# Patient Record
Sex: FEMALE | Race: WHITE
[De-identification: names, ages, dates, MRNs, and addresses within clinical notes are randomized per-mention and may not be internally consistent; named-entity substitution may affect disease eponyms.]

---

## 2022-05-11 ENCOUNTER — HOSPITAL ENCOUNTER (EMERGENCY)
Dept: HOSPITAL 47 - EC | Age: 62
Discharge: HOME | End: 2022-05-11
Payer: MEDICARE

## 2022-05-11 VITALS — DIASTOLIC BLOOD PRESSURE: 60 MMHG | HEART RATE: 82 BPM | SYSTOLIC BLOOD PRESSURE: 110 MMHG | TEMPERATURE: 98 F

## 2022-05-11 VITALS — RESPIRATION RATE: 20 BRPM

## 2022-05-11 DIAGNOSIS — M54.50: Primary | ICD-10-CM

## 2022-05-11 LAB
ALBUMIN SERPL-MCNC: 4.2 G/DL (ref 3.5–5)
ALP SERPL-CCNC: 49 U/L (ref 38–126)
ALT SERPL-CCNC: 26 U/L (ref 4–34)
AMYLASE SERPL-CCNC: 75 U/L (ref 30–110)
ANION GAP SERPL CALC-SCNC: 6 MMOL/L
AST SERPL-CCNC: 28 U/L (ref 14–36)
BASOPHILS # BLD AUTO: 0 K/UL (ref 0–0.2)
BASOPHILS NFR BLD AUTO: 1 %
BUN SERPL-SCNC: 11 MG/DL (ref 7–17)
CALCIUM SPEC-MCNC: 9.1 MG/DL (ref 8.4–10.2)
CHLORIDE SERPL-SCNC: 101 MMOL/L (ref 98–107)
CO2 SERPL-SCNC: 29 MMOL/L (ref 22–30)
EOSINOPHIL # BLD AUTO: 0.3 K/UL (ref 0–0.7)
EOSINOPHIL NFR BLD AUTO: 4 %
ERYTHROCYTE [DISTWIDTH] IN BLOOD BY AUTOMATED COUNT: 3.98 M/UL (ref 3.8–5.4)
ERYTHROCYTE [DISTWIDTH] IN BLOOD: 13.2 % (ref 11.5–15.5)
GLUCOSE SERPL-MCNC: 218 MG/DL (ref 74–99)
HCT VFR BLD AUTO: 40.5 % (ref 34–46)
HGB BLD-MCNC: 12.9 GM/DL (ref 11.4–16)
LIPASE SERPL-CCNC: 165 U/L (ref 23–300)
LYMPHOCYTES # SPEC AUTO: 2.1 K/UL (ref 1–4.8)
LYMPHOCYTES NFR SPEC AUTO: 28 %
MCH RBC QN AUTO: 32.5 PG (ref 25–35)
MCHC RBC AUTO-ENTMCNC: 31.8 G/DL (ref 31–37)
MCV RBC AUTO: 101.9 FL (ref 80–100)
MONOCYTES # BLD AUTO: 0.4 K/UL (ref 0–1)
MONOCYTES NFR BLD AUTO: 5 %
NEUTROPHILS # BLD AUTO: 4.5 K/UL (ref 1.3–7.7)
NEUTROPHILS NFR BLD AUTO: 61 %
PH UR: 6.5 [PH] (ref 5–8)
PLATELET # BLD AUTO: 266 K/UL (ref 150–450)
POTASSIUM SERPL-SCNC: 4.3 MMOL/L (ref 3.5–5.1)
PROT SERPL-MCNC: 7.2 G/DL (ref 6.3–8.2)
RBC UR QL: 1 /HPF (ref 0–5)
SODIUM SERPL-SCNC: 136 MMOL/L (ref 137–145)
SP GR UR: 1.02 (ref 1–1.03)
SQUAMOUS UR QL AUTO: 1 /HPF (ref 0–4)
UROBILINOGEN UR QL STRIP: <2 MG/DL (ref ?–2)
WBC # BLD AUTO: 7.4 K/UL (ref 3.8–10.6)
WBC # UR AUTO: 3 /HPF (ref 0–5)

## 2022-05-11 PROCEDURE — 85025 COMPLETE CBC W/AUTO DIFF WBC: CPT

## 2022-05-11 PROCEDURE — 74176 CT ABD & PELVIS W/O CONTRAST: CPT

## 2022-05-11 PROCEDURE — 36415 COLL VENOUS BLD VENIPUNCTURE: CPT

## 2022-05-11 PROCEDURE — 96375 TX/PRO/DX INJ NEW DRUG ADDON: CPT

## 2022-05-11 PROCEDURE — 96361 HYDRATE IV INFUSION ADD-ON: CPT

## 2022-05-11 PROCEDURE — 83690 ASSAY OF LIPASE: CPT

## 2022-05-11 PROCEDURE — 96374 THER/PROPH/DIAG INJ IV PUSH: CPT

## 2022-05-11 PROCEDURE — 81001 URINALYSIS AUTO W/SCOPE: CPT

## 2022-05-11 PROCEDURE — 82150 ASSAY OF AMYLASE: CPT

## 2022-05-11 PROCEDURE — 83605 ASSAY OF LACTIC ACID: CPT

## 2022-05-11 PROCEDURE — 80053 COMPREHEN METABOLIC PANEL: CPT

## 2022-05-11 PROCEDURE — 99284 EMERGENCY DEPT VISIT MOD MDM: CPT

## 2022-05-11 NOTE — ED
Abdominal Pain HPI





- General


Chief Complaint: Abdominal Pain


Stated Complaint: Back pain


Time Seen by Provider: 05/11/22 11:32


Source: patient, EMS, RN notes reviewed


Mode of arrival: EMS


Limitations: no limitations





- History of Present Illness


Initial Comments: 


61-year-old female presents emergency Department with chief complaint of flank 

pain.  Patient is a sudden onset.  Nothing makes the pain feel better or worse 

at this time.  She's a history kidney stones.  Slight nausea no vomiting no 

diarrhea no constipation no dysuria no hematuria.  Patient states that she had a

call EMS because of the discomfort.  She rates the pain 7 on a 10 NO KNOWN DRUG 

ALLERGIES.








- Related Data


                                  Previous Rx's











 Medication  Instructions  Recorded


 


Cyclobenzaprine [Flexeril] 5 mg PO TID PRN #15 tablet 05/11/22


 


Ibuprofen [Motrin] 600 mg PO Q8HR PRN #20 tab 05/11/22











                                    Allergies











Allergy/AdvReac Type Severity Reaction Status Date / Time


 


No Known Allergies Allergy   Verified 05/11/22 11:37














Review of Systems


ROS Statement: 


Those systems with pertinent positive or pertinent negative responses have been 

documented in the HPI.





ROS Other: All systems not noted in ROS Statement are negative.





Past Medical History


Past Medical History: Diabetes Mellitus


History of Any Multi-Drug Resistant Organisms: None Reported


Past Surgical History: No Surgical Hx Reported


Past Psychological History: Anxiety, Depression


Smoking Status: Never smoker


Past Alcohol Use History: Occasional


Past Drug Use History: None Reported





General Exam


General appearance: alert, in no apparent distress


Head exam: Present: atraumatic, normocephalic, normal inspection


Eye exam: Present: normal appearance, PERRL, EOMI.  Absent: scleral icterus, 

conjunctival injection, periorbital swelling


ENT exam: Present: normal exam, normal oropharynx, mucous membranes moist


Neck exam: Present: normal inspection, full ROM.  Absent: tenderness, 

meningismus, lymphadenopathy


Respiratory exam: Present: normal lung sounds bilaterally.  Absent: respiratory 

distress, wheezes, rales, rhonchi, stridor


Cardiovascular Exam: Present: regular rate, normal rhythm, normal heart sounds. 

Absent: systolic murmur, diastolic murmur, rubs, gallop, clicks


GI/Abdominal exam: Present: soft, normal bowel sounds.  Absent: distended, 

tenderness, guarding, rebound, rigid


Back exam: Present: normal inspection, full ROM, tenderness, CVA tenderness (L).

 Absent: CVA tenderness (R)


Neurological exam: Present: alert, oriented X3





Course


                                   Vital Signs











  05/11/22





  11:29


 


Temperature 97.7 F


 


Pulse Rate 87


 


Respiratory 20





Rate 


 


Blood Pressure 107/57


 


O2 Sat by Pulse 95





Oximetry 














Medical Decision Making





- Medical Decision Making





patient presented for flank pain, back pain.  Does seem to be sporadic in nature

and may be related to mechanical back pain patient CT is unremarkable labs and 

urinalysis unremarkable patient we discharged stable condition return parameters

discussed.





- Lab Data


Result diagrams: 


                                 05/11/22 12:40





                                 05/11/22 12:40


                                   Lab Results











  05/11/22 05/11/22 05/11/22 Range/Units





  12:40 12:40 12:40 


 


WBC  7.4    (3.8-10.6)  k/uL


 


RBC  3.98    (3.80-5.40)  m/uL


 


Hgb  12.9    (11.4-16.0)  gm/dL


 


Hct  40.5    (34.0-46.0)  %


 


MCV  101.9 H    (80.0-100.0)  fL


 


MCH  32.5    (25.0-35.0)  pg


 


MCHC  31.8    (31.0-37.0)  g/dL


 


RDW  13.2    (11.5-15.5)  %


 


Plt Count  266    (150-450)  k/uL


 


MPV  7.3    


 


Neutrophils %  61    %


 


Lymphocytes %  28    %


 


Monocytes %  5    %


 


Eosinophils %  4    %


 


Basophils %  1    %


 


Neutrophils #  4.5    (1.3-7.7)  k/uL


 


Lymphocytes #  2.1    (1.0-4.8)  k/uL


 


Monocytes #  0.4    (0-1.0)  k/uL


 


Eosinophils #  0.3    (0-0.7)  k/uL


 


Basophils #  0.0    (0-0.2)  k/uL


 


Macrocytosis  Slight    


 


Sodium    136 L  (137-145)  mmol/L


 


Potassium    4.3  (3.5-5.1)  mmol/L


 


Chloride    101  ()  mmol/L


 


Carbon Dioxide    29  (22-30)  mmol/L


 


Anion Gap    6  mmol/L


 


BUN    11  (7-17)  mg/dL


 


Creatinine    0.63  (0.52-1.04)  mg/dL


 


Est GFR (CKD-EPI)AfAm    >90  (>60 ml/min/1.73 sqM)  


 


Est GFR (CKD-EPI)NonAf    >90  (>60 ml/min/1.73 sqM)  


 


Glucose    218 H  (74-99)  mg/dL


 


Plasma Lactic Acid Silvio     (0.7-2.0)  mmol/L


 


Calcium    9.1  (8.4-10.2)  mg/dL


 


Total Bilirubin    0.3  (0.2-1.3)  mg/dL


 


AST    28  (14-36)  U/L


 


ALT    26  (4-34)  U/L


 


Alkaline Phosphatase    49  ()  U/L


 


Total Protein    7.2  (6.3-8.2)  g/dL


 


Albumin    4.2  (3.5-5.0)  g/dL


 


Amylase    75  ()  U/L


 


Lipase    165  ()  U/L


 


Urine Color   Yellow   


 


Urine Appearance   Cloudy H   (Clear)  


 


Urine pH   6.5   (5.0-8.0)  


 


Ur Specific Gravity   1.023   (1.001-1.035)  


 


Urine Protein   Negative   (Negative)  


 


Urine Glucose (UA)   Negative   (Negative)  


 


Urine Ketones   Negative   (Negative)  


 


Urine Blood   Negative   (Negative)  


 


Urine Nitrite   Negative   (Negative)  


 


Urine Bilirubin   Negative   (Negative)  


 


Urine Urobilinogen   <2.0   (<2.0)  mg/dL


 


Ur Leukocyte Esterase   Negative   (Negative)  


 


Urine RBC   1   (0-5)  /hpf


 


Urine WBC   3   (0-5)  /hpf


 


Ur Squamous Epith Cells   1   (0-4)  /hpf


 


Calcium Oxalate Crystal   Few H   (None)  /hpf


 


Amorphous Sediment   Moderate H   (None)  /hpf


 


Urine Mucus   Few H   (None)  /hpf














  05/11/22 Range/Units





  12:40 


 


WBC   (3.8-10.6)  k/uL


 


RBC   (3.80-5.40)  m/uL


 


Hgb   (11.4-16.0)  gm/dL


 


Hct   (34.0-46.0)  %


 


MCV   (80.0-100.0)  fL


 


MCH   (25.0-35.0)  pg


 


MCHC   (31.0-37.0)  g/dL


 


RDW   (11.5-15.5)  %


 


Plt Count   (150-450)  k/uL


 


MPV   


 


Neutrophils %   %


 


Lymphocytes %   %


 


Monocytes %   %


 


Eosinophils %   %


 


Basophils %   %


 


Neutrophils #   (1.3-7.7)  k/uL


 


Lymphocytes #   (1.0-4.8)  k/uL


 


Monocytes #   (0-1.0)  k/uL


 


Eosinophils #   (0-0.7)  k/uL


 


Basophils #   (0-0.2)  k/uL


 


Macrocytosis   


 


Sodium   (137-145)  mmol/L


 


Potassium   (3.5-5.1)  mmol/L


 


Chloride   ()  mmol/L


 


Carbon Dioxide   (22-30)  mmol/L


 


Anion Gap   mmol/L


 


BUN   (7-17)  mg/dL


 


Creatinine   (0.52-1.04)  mg/dL


 


Est GFR (CKD-EPI)AfAm   (>60 ml/min/1.73 sqM)  


 


Est GFR (CKD-EPI)NonAf   (>60 ml/min/1.73 sqM)  


 


Glucose   (74-99)  mg/dL


 


Plasma Lactic Acid Silvio  1.2  (0.7-2.0)  mmol/L


 


Calcium   (8.4-10.2)  mg/dL


 


Total Bilirubin   (0.2-1.3)  mg/dL


 


AST   (14-36)  U/L


 


ALT   (4-34)  U/L


 


Alkaline Phosphatase   ()  U/L


 


Total Protein   (6.3-8.2)  g/dL


 


Albumin   (3.5-5.0)  g/dL


 


Amylase   ()  U/L


 


Lipase   ()  U/L


 


Urine Color   


 


Urine Appearance   (Clear)  


 


Urine pH   (5.0-8.0)  


 


Ur Specific Gravity   (1.001-1.035)  


 


Urine Protein   (Negative)  


 


Urine Glucose (UA)   (Negative)  


 


Urine Ketones   (Negative)  


 


Urine Blood   (Negative)  


 


Urine Nitrite   (Negative)  


 


Urine Bilirubin   (Negative)  


 


Urine Urobilinogen   (<2.0)  mg/dL


 


Ur Leukocyte Esterase   (Negative)  


 


Urine RBC   (0-5)  /hpf


 


Urine WBC   (0-5)  /hpf


 


Ur Squamous Epith Cells   (0-4)  /hpf


 


Calcium Oxalate Crystal   (None)  /hpf


 


Amorphous Sediment   (None)  /hpf


 


Urine Mucus   (None)  /hpf














Disposition


Clinical Impression: 


 Back pain





Disposition: HOME SELF-CARE


Condition: Stable


Instructions (If sedation given, give patient instructions):  Back Pain (ED)


Additional Instructions: 


Please return to the Emergency Department if symptoms worsen or any other 

concerns.


Prescriptions: 


Cyclobenzaprine [Flexeril] 5 mg PO TID PRN #15 tablet


 PRN Reason: Muscle Spasm


Ibuprofen [Motrin] 600 mg PO Q8HR PRN #20 tab


 PRN Reason: Pain


Is patient prescribed a controlled substance at d/c from ED?: No


Referrals: 


None,Stated [Primary Care Provider] - 1-2 days


Time of Disposition: 14:50

## 2022-05-11 NOTE — CT
EXAMINATION TYPE: CT abdomen pelvis wo con

 

DATE OF EXAM: 5/11/2022

 

COMPARISON: None available

 

HISTORY: Abdominal pain

 

CT DLP: 384 mGycm

Automated exposure control for dose reduction was used.

 

TECHNIQUE:  Helical acquisition of images was performed from the lung bases through the pelvis. No IV
 contrast administration.

 

FINDINGS: 

 

LUNG BASES: No significant abnormality is appreciated.

 

LIVER/GB: No definite focal lesion by this nonenhanced CT scan. The gallbladder is not completely dis
tended with suboptimal assessment of that location.

 

PANCREAS: Atrophic.

 

SPLEEN: No significant abnormality is seen.

 

ADRENALS: No significant abnormality is seen.

 

KIDNEYS: No radiodense renal calculi. No hydroureter or hydronephrosis.

 

FREE AIR:  No free air is visualized

 

RETROPERITONEAL ADENOPATHY:  None visualized

 

REPRODUCTIVE ORGANS: No gross uterine or adnexal mass yet suboptimally assessed.

 

URINARY BLADDER:  Almost completely collapsed.

 

PELVIC ADENOPATHY:  None visualized.

 

OSSEOUS STRUCTURES:  Degenerative changes of the lower thoracic and lumbar spine with the dextroscoli
osis of the lower thoracic spine and levoscoliosis of the lumbar spine.

 

BOWEL:  Questionable thickening of the inferior aspect of the esophagus. Dilated fluid-filled stomach
. Unremarkable duodenum and small bowel. No evidence of bowel obstruction. Suboptimal assessment of t
he small and large bowel. Moderate fecal loading of the colon.

 

OTHER: Minimal arterial atherosclerotic calcifications. No sizable ascites.

 

IMPRESSION:

No definite acute abnormality seen in the abdomen or the pelvis by this nonenhanced CT scan. Incident
al findings as described above.

## 2022-09-03 ENCOUNTER — HOSPITAL ENCOUNTER (EMERGENCY)
Dept: HOSPITAL 47 - EC | Age: 62
Discharge: HOME | End: 2022-09-03
Payer: MEDICARE

## 2022-09-03 VITALS
RESPIRATION RATE: 18 BRPM | DIASTOLIC BLOOD PRESSURE: 75 MMHG | TEMPERATURE: 98.2 F | SYSTOLIC BLOOD PRESSURE: 139 MMHG | HEART RATE: 97 BPM

## 2022-09-03 DIAGNOSIS — R45.851: ICD-10-CM

## 2022-09-03 DIAGNOSIS — F39: Primary | ICD-10-CM

## 2022-09-03 LAB
PH UR: 8 [PH] (ref 5–8)
RBC UR QL: <1 /HPF (ref 0–5)
SP GR UR: 1.02 (ref 1–1.03)
SQUAMOUS UR QL AUTO: 2 /HPF (ref 0–4)
UROBILINOGEN UR QL STRIP: <2 MG/DL (ref ?–2)
WBC #/AREA URNS HPF: 3 /HPF (ref 0–5)

## 2022-09-03 PROCEDURE — 81001 URINALYSIS AUTO W/SCOPE: CPT

## 2022-09-03 PROCEDURE — 99285 EMERGENCY DEPT VISIT HI MDM: CPT

## 2022-09-03 PROCEDURE — 80306 DRUG TEST PRSMV INSTRMNT: CPT

## 2022-09-03 PROCEDURE — 82075 ASSAY OF BREATH ETHANOL: CPT

## 2022-09-03 NOTE — ED
Psych HPI





- General


Chief Complaint: Psychiatric Symptoms


Stated Complaint: EPS eval


Time Seen by Provider: 09/03/22 18:36


Source: patient


Mode of arrival: EMS


Limitations: no limitations





- History of Present Illness


Initial Comments: 





This patient is a 62-year-old woman brought in to have evaluation after she 

became upset and had expressed some suicidal thoughts.  Patient is at [facility] home, 

and states that she had a stressful conversation with her sister.  She states 

that they had called each other names and then she became quite upset.  The 

patient also reportedly had told one staff there that she wish she would die.  

The patient states she does not have a plan.


MD Complaint: suicidal ideation, feels depressed


-: hour(s)


Associated Psychiatric Symptoms: depression, suicidal ideation


History of same: Yes


Quality: getting worse


Improves With: none


Worsens With: none


Context: significant life stressor





- Related Data


                                  Previous Rx's











 Medication  Instructions  Recorded


 


Cyclobenzaprine [Flexeril] 5 mg PO TID PRN #15 tablet 05/11/22


 


Ibuprofen [Motrin] 600 mg PO Q8HR PRN #20 tab 05/11/22


 


Lidocaine 5% Patch [Lidoderm 5% 1 each TP DAILY PRN 14 Days #14 08/20/22





Patch] patch 


 


guaiFENesin 200 mg PO BID PRN 10 Days #20 08/21/22





 tablet 











                                    Allergies











Allergy/AdvReac Type Severity Reaction Status Date / Time


 


No Known Allergies Allergy   Verified 08/21/22 16:59














Review of Systems


ROS Statement: 


Those systems with pertinent positive or pertinent negative responses have been 

documented in the HPI.





ROS Other: All systems not noted in ROS Statement are negative.


Constitutional: Denies: fever, weakness


Eyes: Denies: vision change


Respiratory: Denies: cough, dyspnea


Cardiovascular: Denies: chest pain, syncope


Gastrointestinal: Denies: abdominal pain, vomiting, diarrhea


Genitourinary: Denies: dysuria


Musculoskeletal: Denies: back pain


Neurological: Denies: headache, weakness


Psychiatric: Reports: depression, suicidal thoughts





Past Medical History


Past Medical History: Diabetes Mellitus


History of Any Multi-Drug Resistant Organisms: None Reported


Past Surgical History: No Surgical Hx Reported


Past Psychological History: Anxiety, Depression


Smoking Status: Never smoker


Past Alcohol Use History: Occasional


Past Drug Use History: None Reported





General Exam


Limitations: altered mental status


General appearance: alert, anxious


Head exam: Present: atraumatic, normocephalic


Eye exam: Present: normal appearance.  Absent: scleral icterus, conjunctival 

injection


ENT exam: Present: normal oropharynx


Neck exam: Present: normal inspection


Respiratory exam: Present: normal lung sounds bilaterally.  Absent: respiratory 

distress, wheezes, rales, rhonchi, stridor


Cardiovascular Exam: Present: regular rate, normal rhythm, normal heart sounds. 

Absent: systolic murmur, diastolic murmur, rubs, gallop


GI/Abdominal exam: Present: soft.  Absent: distended, tenderness, guarding, 

rebound


Extremities exam: Present: normal inspection, normal capillary refill.  Absent: 

pedal edema


Neurological exam: Present: alert, normal gait


Psychiatric exam: Present: normal affect, anxious, other (Patient displays 

labile mood. ).  Absent: depressed, agitated, flat affect, homicidal ideation, 

suicidal ideation


Skin exam: Present: warm, dry, intact, normal color.  Absent: rash





Course


                                   Vital Signs











  09/03/22





  18:40


 


Temperature 98.2 F


 


Pulse Rate 97


 


Respiratory 18





Rate 


 


Blood Pressure 139/75


 


O2 Sat by Pulse 97





Oximetry 














Medical Decision Making





- Lab Data


                                   Lab Results











  09/03/22 Range/Units





  20:11 


 


Urine Color  Yellow  


 


Urine Appearance  Cloudy H  (Clear)  


 


Urine pH  8.0  (5.0-8.0)  


 


Ur Specific Gravity  1.025  (1.001-1.035)  


 


Urine Protein  Trace H  (Negative)  


 


Urine Glucose (UA)  Negative  (Negative)  


 


Urine Ketones  Negative  (Negative)  


 


Urine Blood  Negative  (Negative)  


 


Urine Nitrite  Negative  (Negative)  


 


Urine Bilirubin  Negative  (Negative)  


 


Urine Urobilinogen  <2.0  (<2.0)  mg/dL


 


Ur Leukocyte Esterase  Small H  (Negative)  


 


Urine RBC  <1  (0-5)  /hpf


 


Urine WBC  3  (0-5)  /hpf


 


Ur Squamous Epith Cells  2  (0-4)  /hpf


 


Calcium Oxalate Crystal  Many H  (None)  /hpf


 


Urine Mucus  Rare H  (None)  /hpf














Disposition


Clinical Impression: 


 Mood disorder





Disposition: HOME SELF-CARE


Condition: Good


Instructions (If sedation given, give patient instructions):  Mood Disorders 

(ED)


Is patient prescribed a controlled substance at d/c from ED?: No


Referrals: 


Nonstaff,Physician [Primary Care Provider] - 1-2 days

## 2022-09-04 ENCOUNTER — HOSPITAL ENCOUNTER (EMERGENCY)
Dept: HOSPITAL 47 - EC | Age: 62
Discharge: OTHER INSTITUTION | End: 2022-09-04
Payer: MEDICARE

## 2022-09-04 VITALS — TEMPERATURE: 98 F | HEART RATE: 88 BPM | DIASTOLIC BLOOD PRESSURE: 74 MMHG | SYSTOLIC BLOOD PRESSURE: 129 MMHG

## 2022-09-04 VITALS — RESPIRATION RATE: 18 BRPM

## 2022-09-04 DIAGNOSIS — F13.90: ICD-10-CM

## 2022-09-04 DIAGNOSIS — E11.9: ICD-10-CM

## 2022-09-04 DIAGNOSIS — F39: Primary | ICD-10-CM

## 2022-09-04 PROCEDURE — 80306 DRUG TEST PRSMV INSTRMNT: CPT

## 2022-09-04 PROCEDURE — 99285 EMERGENCY DEPT VISIT HI MDM: CPT

## 2022-09-04 PROCEDURE — 71046 X-RAY EXAM CHEST 2 VIEWS: CPT

## 2022-09-04 PROCEDURE — 82075 ASSAY OF BREATH ETHANOL: CPT

## 2022-09-04 NOTE — ED
Psych HPI





- General


Stated Complaint: EPS eval


Time Seen by Provider: 09/04/22 15:07


Source: patient, police, EMS, RN notes reviewed, old records reviewed


Mode of arrival: EMS





- History of Present Illness


Initial Comments: 





62-year-old female history depression who was brought in by EMS today with 

complaints of suicidal thoughts and ideation I did discuss this with the 

paramedics as well as police who did accompany the patient.  She is expressing 

suicidal thoughts though no particular plan at this time.  She was evaluated for

the same yesterday.  She has a fevers chills nausea times sweats no drugs or 

alcohol.  She states she does have a right rib pain from a fall from 3 weeks 

ago.  She has been previously evaluated for this.


MD Complaint: suicidal ideation, feels depressed





- Related Data


                                  Previous Rx's











 Medication  Instructions  Recorded


 


Cyclobenzaprine [Flexeril] 5 mg PO TID PRN #15 tablet 05/11/22


 


Ibuprofen [Motrin] 600 mg PO Q8HR PRN #20 tab 05/11/22


 


Lidocaine 5% Patch [Lidoderm 5% 1 each TP DAILY PRN 14 Days #14 08/20/22





Patch] patch 


 


guaiFENesin 200 mg PO BID PRN 10 Days #20 08/21/22





 tablet 











                                    Allergies











Allergy/AdvReac Type Severity Reaction Status Date / Time


 


No Known Allergies Allergy   Verified 08/21/22 16:59














Review of Systems


ROS Statement: 


Those systems with pertinent positive or pertinent negative responses have been 

documented in the HPI.





ROS Other: All systems not noted in ROS Statement are negative.





Past Medical History


Past Medical History: Diabetes Mellitus


History of Any Multi-Drug Resistant Organisms: None Reported


Past Surgical History: No Surgical Hx Reported


Past Psychological History: Anxiety, Depression


Smoking Status: Never smoker


Past Alcohol Use History: Occasional


Past Drug Use History: None Reported





General Exam





- General Exam Comments


Initial Comments: 





This is a well-developed thin appearing female who is awake and alert oriented 

4


General appearance: alert, in no apparent distress


Head exam: Present: atraumatic, normocephalic, normal inspection


Eye exam: Present: normal appearance, PERRL, EOMI.  Absent: scleral icterus, 

conjunctival injection, periorbital swelling


ENT exam: Present: normal exam, mucous membranes moist


Neck exam: Present: normal inspection, full ROM, other (No stridor JVD or 

bruits).  Absent: tenderness, meningismus, lymphadenopathy


Respiratory exam: Present: normal lung sounds bilaterally, chest wall tenderness

(Alrex side).  Absent: respiratory distress, wheezes, rales, rhonchi, stridor


Cardiovascular Exam: Present: regular rate, normal rhythm, normal heart sounds. 

Absent: systolic murmur, diastolic murmur, rubs, gallop, clicks


GI/Abdominal exam: Present: soft, normal bowel sounds.  Absent: distended, 

tenderness, guarding, rebound, rigid


Extremities exam: Present: normal inspection, full ROM, normal capillary refill.

 Absent: tenderness, pedal edema, joint swelling, calf tenderness


Back exam: Present: normal inspection


Neurological exam: Present: alert, oriented X3, CN II-XII intact


Psychiatric exam: Present: depressed, anxious, flat affect, suicidal ideation


Skin exam: Present: warm, dry, intact, normal color.  Absent: rash





Course


                                   Vital Signs











  09/04/22





  15:15


 


Temperature 98.4 F


 


Pulse Rate 68


 


Respiratory 18





Rate 


 


Blood Pressure 116/92


 


O2 Sat by Pulse 95





Oximetry 














Medical Decision Making





- Medical Decision Making





The patient was evaluated by the EPS service and found to be wrist herself or 

anyone else at this time she will be discharged





- Lab Data


                                   Lab Results











  09/04/22 Range/Units





  16:24 


 


Urine Opiates Screen  Not Detected  (NotDetected)  


 


Ur Oxycodone Screen  Not Detected  (NotDetected)  


 


Urine Methadone Screen  Not Detected  (NotDetected)  


 


Ur Propoxyphene Screen  Not Detected  (NotDetected)  


 


Ur Barbiturates Screen  Not Detected  (NotDetected)  


 


U Tricyclic Antidepress  Not Detected  (NotDetected)  


 


Ur Phencyclidine Scrn  Not Detected  (NotDetected)  


 


Ur Amphetamines Screen  Not Detected  (NotDetected)  


 


U Methamphetamines Scrn  Not Detected  (NotDetected)  


 


U Benzodiazepines Scrn  Detected H  (NotDetected)  


 


Urine Cocaine Screen  Not Detected  (NotDetected)  


 


U Marijuana (THC) Screen  Not Detected  (NotDetected)  














Disposition


Clinical Impression: 


 Mood disorder, Adjustment reaction of adult life





Disposition: ADMITTED AS IP TO THIS [facility]


Condition: Good


Instructions (If sedation given, give patient instructions):  Mood Disorders 

(ED)


Is patient prescribed a controlled substance at d/c from ED?: No


Referrals: 


Nonstaff,Physician [Primary Care Provider] - 1-2 days


Decision Date: 09/04/22


Decision Time: 19:28

## 2022-09-04 NOTE — XR
EXAMINATION TYPE: XR chest 2V

 

DATE OF EXAM: 9/4/2022

 

COMPARISON: NONE

 

HISTORY: Chest pain

 

TECHNIQUE: 2 views

 

FINDINGS: Heart and mediastinum are normal. Lungs are clear. Diaphragm is normal. Bony thorax is inta
ct. There is mild thoracic dextroscoliosis.

 

IMPRESSION: No active cardiopulmonary disease. Normal heart. No change.

## 2022-09-07 ENCOUNTER — HOSPITAL ENCOUNTER (EMERGENCY)
Dept: HOSPITAL 47 - EC | Age: 62
Discharge: HOME | End: 2022-09-07
Payer: MEDICARE

## 2022-09-07 VITALS
RESPIRATION RATE: 18 BRPM | TEMPERATURE: 97.9 F | DIASTOLIC BLOOD PRESSURE: 69 MMHG | HEART RATE: 87 BPM | SYSTOLIC BLOOD PRESSURE: 133 MMHG

## 2022-09-07 DIAGNOSIS — R45.851: Primary | ICD-10-CM

## 2022-09-07 DIAGNOSIS — F13.90: ICD-10-CM

## 2022-09-07 DIAGNOSIS — E11.9: ICD-10-CM

## 2022-09-07 LAB
ANION GAP SERPL CALC-SCNC: 11 MMOL/L
BASOPHILS # BLD AUTO: 0.1 K/UL (ref 0–0.2)
BASOPHILS NFR BLD AUTO: 1 %
BUN SERPL-SCNC: 8 MG/DL (ref 7–17)
CALCIUM SPEC-MCNC: 9.1 MG/DL (ref 8.4–10.2)
CHLORIDE SERPL-SCNC: 93 MMOL/L (ref 98–107)
CO2 SERPL-SCNC: 24 MMOL/L (ref 22–30)
EOSINOPHIL # BLD AUTO: 0.3 K/UL (ref 0–0.7)
EOSINOPHIL NFR BLD AUTO: 3 %
ERYTHROCYTE [DISTWIDTH] IN BLOOD BY AUTOMATED COUNT: 3.59 M/UL (ref 3.8–5.4)
ERYTHROCYTE [DISTWIDTH] IN BLOOD: 12.5 % (ref 11.5–15.5)
GLUCOSE SERPL-MCNC: 82 MG/DL (ref 74–99)
HCT VFR BLD AUTO: 36.2 % (ref 34–46)
HGB BLD-MCNC: 11.6 GM/DL (ref 11.4–16)
LYMPHOCYTES # SPEC AUTO: 3.5 K/UL (ref 1–4.8)
LYMPHOCYTES NFR SPEC AUTO: 39 %
MCH RBC QN AUTO: 32.2 PG (ref 25–35)
MCHC RBC AUTO-ENTMCNC: 31.9 G/DL (ref 31–37)
MCV RBC AUTO: 100.9 FL (ref 80–100)
MONOCYTES # BLD AUTO: 0.4 K/UL (ref 0–1)
MONOCYTES NFR BLD AUTO: 5 %
NEUTROPHILS # BLD AUTO: 4.6 K/UL (ref 1.3–7.7)
NEUTROPHILS NFR BLD AUTO: 51 %
PLATELET # BLD AUTO: 315 K/UL (ref 150–450)
POTASSIUM SERPL-SCNC: 4.4 MMOL/L (ref 3.5–5.1)
SODIUM SERPL-SCNC: 128 MMOL/L (ref 137–145)
WBC # BLD AUTO: 9 K/UL (ref 3.8–10.6)

## 2022-09-07 PROCEDURE — 36415 COLL VENOUS BLD VENIPUNCTURE: CPT

## 2022-09-07 PROCEDURE — 82075 ASSAY OF BREATH ETHANOL: CPT

## 2022-09-07 PROCEDURE — 85025 COMPLETE CBC W/AUTO DIFF WBC: CPT

## 2022-09-07 PROCEDURE — 80306 DRUG TEST PRSMV INSTRMNT: CPT

## 2022-09-07 PROCEDURE — 99285 EMERGENCY DEPT VISIT HI MDM: CPT

## 2022-09-07 PROCEDURE — 80048 BASIC METABOLIC PNL TOTAL CA: CPT

## 2022-09-07 PROCEDURE — 96360 HYDRATION IV INFUSION INIT: CPT

## 2022-09-07 NOTE — ED
General Adult HPI





- General


Chief complaint: Psychiatric Symptoms


Stated complaint: EPS eval


Time Seen by Provider: 09/07/22 17:13


Source: patient, EMS, RN notes reviewed, old records reviewed


Limitations: no limitations





- History of Present Illness


Initial comments: 





Patient is a 62-year-old female who presents to the department complaining of 

suicidal thoughts for "quite a while."  She has been evaluated multiple times 

over the past week and cleared for discharge.  Currently has no acute 

complaints.  He endorses suicidal ideations but denies any plans or attempts.  

Denies homicidal ideations, attempts, plans.  Denies visual or auditory 

hallucinations.  His no other acute complaints at this time.  Presents for 

further evaluation.  Denies fevers, chills, sick contacts, chest pain, shortness

of breath.





- Related Data


                                  Previous Rx's











 Medication  Instructions  Recorded


 


Cyclobenzaprine [Flexeril] 5 mg PO TID PRN #15 tablet 05/11/22


 


Ibuprofen [Motrin] 600 mg PO Q8HR PRN #20 tab 05/11/22


 


Lidocaine 5% Patch [Lidoderm 5% 1 each TP DAILY PRN 14 Days #14 08/20/22





Patch] patch 


 


guaiFENesin 200 mg PO BID PRN 10 Days #20 08/21/22





 tablet 











                                    Allergies











Allergy/AdvReac Type Severity Reaction Status Date / Time


 


No Known Allergies Allergy   Verified 08/21/22 16:59














Review of Systems


ROS Statement: 


Those systems with pertinent positive or pertinent negative responses have been 

documented in the HPI.


Review of Systems:


CONST: Denies fever 


EYES: Denies blurry vision 


ENT: Denies nasal congestion  


C/V:  Denies Chest pain


RESP: Denies shortness of breath 


GI: Denies abdominal pain 


: Denies dysuria  


SKIN: Denies rash.


MSK: Denies joint pain.


NEURO: Denies headache 


PSYCH: Denies homicidal ideations/plans/attempts.  Denies visual or auditory 

hallucinations.  He endorses suicidal ideations.  Denies plans or attempts.


ROS Other: All systems not noted in ROS Statement are negative.





Past Medical History


Past Medical History: Diabetes Mellitus


History of Any Multi-Drug Resistant Organisms: None Reported


Past Surgical History: No Surgical Hx Reported


Past Psychological History: Anxiety, Depression


Smoking Status: Never smoker


Past Alcohol Use History: Occasional


Past Drug Use History: None Reported





General Exam





- General Exam Comments


Initial Comments: 





General: Appears in no acute distress.


HEAD:  Normal with no signs of head trauma.


EYES:  PERRLA, EOMI, conjunctiva normal, no discharge.


ENT:  Hearing grossly intact, normal oropharynx.


RESPIRATORY:  Clear breath sounds bilaterally.  No wheezes, rales, or rhonchi.  


C/V:  Regular rate and rhythm. S1 and S2 auscultated, no edema, peripheral 

pulses 2+ and intact throughout


ABD:  Abd is soft, nontender, nondistended


EXT: Normal range of motion, no obvious deformity


SKIN:  No rashes or lesions observed on exposed skin.


NEURO: Alert and oriented x 4.  Cranial nerves II-XII intact. No focal sensory 

or strength deficits.


Limitations: no limitations





Course


                                   Vital Signs











  09/07/22





  17:20


 


Temperature 97.9 F


 


Pulse Rate 87


 


Respiratory 18





Rate 


 


Blood Pressure 133/69


 


O2 Sat by Pulse 97





Oximetry 














Medical Decision Making





- Medical Decision Making





Based on the patient's presentation and physical exam, do believe she requires 

psychiatric evaluation.  We will obtain basic laboratory studies due to her age.

 She is pacing green scrubs.  Suicide precautions and sitter were ordered.  

Vital signs are within normal notes.  Patient's laboratory studies are 

remarkable for mild hyponatremia and hypochloremia which will be treated with IV

fluids.  Remainder labs are unremarkable except for a UDS positive for benzos.  

BAT is 0.





At this time patient is medically cleared for evaluation by psychiatry.  

Disposition is pending psychiatric evaluation.  EPS was notified.





EPS evaluated the patient, determined that she does not meet criteria for inp

atient admission.  Cleared her for discharge home.





Patient will be discharged home in stable condition.  Follow safety plan.





- Lab Data


Result diagrams: 


                                 09/07/22 18:18





                                 09/07/22 18:18


                                   Lab Results











  09/07/22 09/07/22 09/07/22 Range/Units





  18:18 18:18 18:18 


 


WBC  9.0    (3.8-10.6)  k/uL


 


RBC  3.59 L    (3.80-5.40)  m/uL


 


Hgb  11.6    (11.4-16.0)  gm/dL


 


Hct  36.2    (34.0-46.0)  %


 


MCV  100.9 H    (80.0-100.0)  fL


 


MCH  32.2    (25.0-35.0)  pg


 


MCHC  31.9    (31.0-37.0)  g/dL


 


RDW  12.5    (11.5-15.5)  %


 


Plt Count  315    (150-450)  k/uL


 


MPV  6.7    


 


Neutrophils %  51    %


 


Lymphocytes %  39    %


 


Monocytes %  5    %


 


Eosinophils %  3    %


 


Basophils %  1    %


 


Neutrophils #  4.6    (1.3-7.7)  k/uL


 


Lymphocytes #  3.5    (1.0-4.8)  k/uL


 


Monocytes #  0.4    (0-1.0)  k/uL


 


Eosinophils #  0.3    (0-0.7)  k/uL


 


Basophils #  0.1    (0-0.2)  k/uL


 


Sodium    128 L  (137-145)  mmol/L


 


Potassium    4.4  (3.5-5.1)  mmol/L


 


Chloride    93 L  ()  mmol/L


 


Carbon Dioxide    24  (22-30)  mmol/L


 


Anion Gap    11  mmol/L


 


BUN    8  (7-17)  mg/dL


 


Creatinine    0.44 L  (0.52-1.04)  mg/dL


 


Est GFR (CKD-EPI)AfAm    >90  (>60 ml/min/1.73 sqM)  


 


Est GFR (CKD-EPI)NonAf    >90  (>60 ml/min/1.73 sqM)  


 


Glucose    82  (74-99)  mg/dL


 


Calcium    9.1  (8.4-10.2)  mg/dL


 


Urine Opiates Screen   Not Detected   (NotDetected)  


 


Ur Oxycodone Screen   Not Detected   (NotDetected)  


 


Urine Methadone Screen   Not Detected   (NotDetected)  


 


Ur Propoxyphene Screen   Not Detected   (NotDetected)  


 


Ur Barbiturates Screen   Not Detected   (NotDetected)  


 


U Tricyclic Antidepress   Not Detected   (NotDetected)  


 


Ur Phencyclidine Scrn   Not Detected   (NotDetected)  


 


Ur Amphetamines Screen   Not Detected   (NotDetected)  


 


U Methamphetamines Scrn   Not Detected   (NotDetected)  


 


U Benzodiazepines Scrn   Detected H   (NotDetected)  


 


Urine Cocaine Screen   Not Detected   (NotDetected)  


 


U Marijuana (THC) Screen   Not Detected   (NotDetected)  














Disposition


Clinical Impression: 


 Encounter for psychiatric assessment





Disposition: HOME SELF-CARE


Condition: Good


Additional Instructions: 


follow safety plan





Is patient prescribed a controlled substance at d/c from ED?: No


Referrals: 


Nonstaff,Physician [Primary Care Provider] - 1-2 days


Time of Disposition: 20:40

## 2023-01-20 ENCOUNTER — HOSPITAL ENCOUNTER (EMERGENCY)
Dept: HOSPITAL 47 - EC | Age: 63
Discharge: HOME | End: 2023-01-20
Payer: MEDICARE

## 2023-01-20 VITALS
TEMPERATURE: 98.2 F | DIASTOLIC BLOOD PRESSURE: 81 MMHG | SYSTOLIC BLOOD PRESSURE: 107 MMHG | HEART RATE: 64 BPM | RESPIRATION RATE: 16 BRPM

## 2023-01-20 DIAGNOSIS — M25.561: Primary | ICD-10-CM

## 2023-01-20 DIAGNOSIS — F41.9: ICD-10-CM

## 2023-01-20 DIAGNOSIS — F32.A: ICD-10-CM

## 2023-01-20 DIAGNOSIS — E11.9: ICD-10-CM

## 2023-01-20 PROCEDURE — 99283 EMERGENCY DEPT VISIT LOW MDM: CPT

## 2023-01-20 NOTE — ED
General Adult HPI





- General


Chief complaint: Extremity Injury, Lower


Stated complaint: Leg pain


Time Seen by Provider: 01/20/23 20:05


Source: patient, RN notes reviewed


Mode of arrival: EMS


Limitations: no limitations





- History of Present Illness


Initial comments: 


62-year-old  female with a past medical history of diabetes mellitus 

presents the emergency department via EMS with chief complaint of right knee 

pain. She reports knee pain that is worse when she walks. She denies previous 

injury or trauma. She has tried taking tylenol without relief. She reports that 

her knee will hurt after she "walks to the courtyard." Pt states "I called EMS 

in order to see my friend Solo. I don't want to be here." No modifying factors 

at this time. 





- Related Data


                                  Previous Rx's











 Medication  Instructions  Recorded


 


Cyclobenzaprine [Flexeril] 5 mg PO TID PRN #15 tablet 05/11/22


 


Ibuprofen [Motrin] 600 mg PO Q8HR PRN #20 tab 05/11/22


 


Lidocaine 5% Patch [Lidoderm 5% 1 each TP DAILY PRN 14 Days #14 08/20/22





Patch] patch 


 


guaiFENesin 200 mg PO BID PRN 10 Days #20 08/21/22





 tablet 











                                    Allergies











Allergy/AdvReac Type Severity Reaction Status Date / Time


 


No Known Allergies Allergy   Verified 09/19/22 21:32














Review of Systems


ROS Statement: 


Those systems with pertinent positive or pertinent negative responses have been 

documented in the HPI.





ROS Other: All systems not noted in ROS Statement are negative.





Past Medical History


Past Medical History: Diabetes Mellitus


History of Any Multi-Drug Resistant Organisms: None Reported


Past Surgical History: No Surgical Hx Reported


Past Psychological History: Anxiety, Depression


Smoking Status: Never smoker


Past Alcohol Use History: Occasional


Past Drug Use History: None Reported





General Exam


Limitations: no limitations


General appearance: alert, in no apparent distress


Head exam: Present: atraumatic, normocephalic, normal inspection


Eye exam: Present: normal appearance, PERRL, EOMI.  Absent: scleral icterus, 

conjunctival injection, periorbital swelling


ENT exam: Present: normal exam, mucous membranes moist


Neck exam: Present: normal inspection.  Absent: tenderness, meningismus, 

lymphadenopathy


Respiratory exam: Present: normal lung sounds bilaterally.  Absent: respiratory 

distress, wheezes, rales, rhonchi, stridor


Cardiovascular Exam: Present: regular rate, normal rhythm, normal heart sounds. 

Absent: systolic murmur, diastolic murmur, rubs, gallop, clicks


GI/Abdominal exam: Present: soft, normal bowel sounds.  Absent: distended, 

tenderness, guarding, rebound, rigid


Extremities exam: Present: normal inspection, full ROM, normal capillary refill.

 Absent: tenderness, pedal edema, joint swelling, calf tenderness


Back exam: Present: normal inspection


Neurological exam: Present: alert, oriented X3, CN II-XII intact, normal gait, 

other (Patient ambulated around the ER without difficulty.)


Psychiatric exam: Present: normal affect, normal mood


Skin exam: Present: warm, dry, intact, normal color.  Absent: rash





Course


                                   Vital Signs











  01/20/23





  20:06


 


Temperature 98.2 F


 


Pulse Rate 64


 


Respiratory 16





Rate 


 


Blood Pressure 107/81


 


O2 Sat by Pulse 99





Oximetry 














- Reevaluation(s)


Reevaluation #1: 





01/20/23 20:44 Spoke with sister Princess who states the patient was seen at her 

primary care office at approximately 3 PM today.  Care physician diagnosed her 

with a muscle strain and recommend Tylenol and Motrin along with physical 

therapy.  She reports that the patient has a history "over exaggerating and 

calling EMS for attention " She is agreeable with plan for discharge.


01/20/23 21:14








Medical Decision Making





- Medical Decision Making





Was pt. sent in by a medical professional or institution (ERNIE Aldana, NP, urgent ca

re, hospital, or nursing home...) When possible be specific


@  -[No]


Did you speak to anyone other than the patient for history (EMS, parent, family,

 police, friend...)? What history was obtained from this source 


@  -[No]


Did you review nursing and triage notes (agree or disagree)?  Why? 


@  -[I reviewed and agree with nursing and triage notes]


Were old charts reviewed (outside hosp., previous admission, EMS record, old 

EKG, old radiological studies, urgent care reports/EKG's, nursing home records)?

Report findings 


@  -[No old charts were reviewed]


Differential Diagnosis (chest pain, altered mental status, abdominal pain women,

abdominal pain men, vaginal bleeding, weakness, fever, dyspnea, syncope, 

headache, dizziness, GI bleed, back pain, seizure, CVA, palpatations, mental 

health)? 


@  -[not applicable]


EKG interpreted by me (3pts min.).


@  -[As above]


X-rays interpreted by me (1pt min.).


@  -[None done]


CT interpreted by me (1pt min.).


@  -[None done]


U/S interpreted by me (1pt. min.).


@  -[None done]


What testing was considered but not performed or refused? (CT, X-rays, U/S, 

labs)? Why?


@  -[None]


What meds were considered but not given or refused? Why?


@  -[None]


Did you discuss the management of the patient with other professionals 

(professionals i.e. DrSally, PA, NP, lab, RT, psych nurse, , , 

teacher, , )? Give summary


@  -[No]


Was smoking cessation discussed for >3mins.?


@  -[No]


Was critical care preformed (if so, how long)?


@  -[No]


Were there social determinants of health that impacted care today? How? 

(Homelessness, low income, unemployed, alcoholism, drug addiction, 

transportation, low edu. Level, literacy, decrease access to med. care, intermediate, 

rehab)?


@  -[No]


Was there de-escalation of care discussed even if they declined (Discuss DNR or 

withdrawal of care, Hospice)? DNR status


@  -[No]


What co-morbidities impacted this encounter? (DM, HTN, Smoking, COPD, CAD, 

Cancer, CVA, ARF, Chemo, Hep., AIDS, mental health diagnosis, sleep apnea, 

morbid obesity)?


@  -[None]


Was patient admitted / discharged? Hospital course, mention meds given and 

route, prescriptions, significant lab abnormalities, going to OR and other 

pertinent info.


@  -62-year-old  female presents to the emergency department with 

atraumatic r knee pain.  She had a history and physical performed.  Physical 

exam is essentially unremarkable, patient is able to ambulate with a steady 

gait.   I discussed the natural history of right knee pain with the patient 

patient verbalized understanding and all questions were addressed.  Return 

precautions were discussed.  Patient was discharged in stable condition. I 

discussed case with FRANCISCO Delgadillo who agrees with plan of care.


Undiagnosed new problem with uncertain prognosis?


@  -[No]


Drug Therapy requiring intensive monitoring for toxicity (Heparin, Nitro, 

Insulin, Cardizem)?


@  -[No]


Were any procedures done?


@  -[No]


Diagnosis/symptom?


@  -right knee pain 


Acute, or Chronic, or Acute on Chronic?


@  -acute 


Uncomplicated (without systemic symptoms) or Complicated (systemic symptoms)?


@  -uncomplicated 


Side effects of treatment?


@  -[No]


Exacerbation, Progression, or Severe Exacerbation?


@  -[No]


Poses a threat to life or bodily function? How? (Chest pain, USA, MI, pneumonia,

 PE, COPD, DKA, ARF, appy, cholecystitis, CVA, Diverticulitis, Homicidal, 

Suicidal, threat to staff... and all critical care pts)


@  -[No]





Disposition


Clinical Impression: 


 Right knee pain





Disposition: HOME SELF-CARE


Condition: Stable


Instructions (If sedation given, give patient instructions):  Knee Sprain (ED)


Additional Instructions: 


Please return to the nearest emergency department if symptoms worsen or persist.

 


Is patient prescribed a controlled substance at d/c from ED?: No


Referrals: 


None,Stated [REFERRING] - 1-2 days


Time of Disposition: 20:41

## 2023-03-20 ENCOUNTER — HOSPITAL ENCOUNTER (EMERGENCY)
Dept: HOSPITAL 47 - EC | Age: 63
Discharge: HOME | End: 2023-03-20
Payer: MEDICARE

## 2023-03-20 VITALS
RESPIRATION RATE: 18 BRPM | TEMPERATURE: 98.8 F | DIASTOLIC BLOOD PRESSURE: 61 MMHG | HEART RATE: 80 BPM | SYSTOLIC BLOOD PRESSURE: 100 MMHG

## 2023-03-20 DIAGNOSIS — F41.9: ICD-10-CM

## 2023-03-20 DIAGNOSIS — F32.A: Primary | ICD-10-CM

## 2023-03-20 DIAGNOSIS — E11.9: ICD-10-CM

## 2023-03-20 PROCEDURE — 99285 EMERGENCY DEPT VISIT HI MDM: CPT

## 2023-03-20 PROCEDURE — 82075 ASSAY OF BREATH ETHANOL: CPT

## 2023-03-20 NOTE — ED
General Adult HPI





- General


Stated complaint: Mental Health


Time Seen by Provider: 03/20/23 13:00


Source: patient, RN notes reviewed


Limitations: no limitations





- History of Present Illness


Initial comments: 





Patient is a pleasant 62-year-old female presenting to the emergency department 

requesting mental health evaluation.  Patient states she just needs to talk to 

somebody.  Patient admits to feeling depressed having suicidal thoughts without 

specific plan.  Patient is upset about letters that she took off a wall and 

threw away.  No homicidal thoughts.  No new physical complaints.





- Related Data


                                  Previous Rx's











 Medication  Instructions  Recorded


 


Cyclobenzaprine [Flexeril] 5 mg PO TID PRN #15 tablet 05/11/22


 


Ibuprofen [Motrin] 600 mg PO Q8HR PRN #20 tab 05/11/22


 


Lidocaine 5% Patch [Lidoderm 5% 1 each TP DAILY PRN 14 Days #14 08/20/22





Patch] patch 


 


guaiFENesin 200 mg PO BID PRN 10 Days #20 08/21/22





 tablet 











                                    Allergies











Allergy/AdvReac Type Severity Reaction Status Date / Time


 


No Known Allergies Allergy   Verified 03/20/23 13:53














Review of Systems


ROS Statement: 


Those systems with pertinent positive or pertinent negative responses have been 

documented in the HPI.





ROS Other: All systems not noted in ROS Statement are negative.


Constitutional: Denies: fever


Eyes: Denies: eye pain


ENT: Denies: ear pain


Respiratory: Denies: cough


Cardiovascular: Denies: chest pain


Endocrine: Denies: fatigue


Gastrointestinal: Denies: abdominal pain


Genitourinary: Denies: dysuria


Musculoskeletal: Denies: back pain


Skin: Denies: rash


Neurological: Denies: weakness


Psychiatric: Reports: as per HPI, depression





Past Medical History


Past Medical History: Diabetes Mellitus


History of Any Multi-Drug Resistant Organisms: None Reported


Past Surgical History: No Surgical Hx Reported


Past Psychological History: Anxiety, Depression


Smoking Status: Never smoker


Past Alcohol Use History: Occasional


Past Drug Use History: None Reported





General Exam


Limitations: no limitations


General appearance: alert, in no apparent distress


Head exam: Present: normocephalic


Eye exam: Present: normal appearance


Neck exam: Present: normal inspection


Respiratory exam: Present: normal lung sounds bilaterally


Cardiovascular Exam: Present: regular rate, normal rhythm


GI/Abdominal exam: Present: soft.  Absent: tenderness


Extremities exam: Present: normal inspection


Neurological exam: Present: alert


Psychiatric exam: Present: depressed


Skin exam: Present: normal color





Course


                                   Vital Signs











  03/20/23





  13:00


 


Temperature 98.8 F


 


Pulse Rate 80


 


Respiratory 18





Rate 


 


Blood Pressure 100/61


 


O2 Sat by Pulse 99





Oximetry 














Medical Decision Making





- Medical Decision Making





Was pt. sent in by a medical professional or institution (ERNIE Aldana, NP, urgent 

care, hospital, or nursing home...) When possible be specific


@  -No


Did you speak to anyone other than the patient for history (EMS, parent, family,

police, friend...)? What history was obtained from this source 


@  -No


Did you review nursing and triage notes (agree or disagree)?  Why? 


@  -I reviewed and agree with nursing and triage notes


Were old charts reviewed (outside hosp., previous admission, EMS record, old 

EKG, old radiological studies, urgent care reports/EKG's, nursing home records)?

Report findings 


@  -No old charts were reviewed


Differential Diagnosis (chest pain, altered mental status, abdominal pain women,

abdominal pain men, vaginal bleeding, weakness, fever, dyspnea, syncope, 

headache, dizziness, GI bleed, back pain, seizure, CVA, palpatations, mental 

health)? 


@  -not applicable


EKG interpreted by me (3pts min.).


@  -As above


X-rays interpreted by me (1pt min.).


@  -None done


CT interpreted by me (1pt min.).


@  -None done


U/S interpreted by me (1pt. min.).


@  -None done


What testing was considered but not performed or refused? (CT, X-rays, U/S, 

labs)? Why?


@  -None


What meds were considered but not given or refused? Why?


@  -None


Did you discuss the management of the patient with other professionals 

(professionals i.e. ERNIE Aldana, NP, lab, RT, psych nurse, , , 

teacher, , )? Give summary


@  -Patient was seen by psychiatric nurse with plans for discharge.


Was smoking cessation discussed for >3mins.?


@  -No


Was critical care preformed (if so, how long)?


@  -No


Were there social determinants of health that impacted care today? How? 

(Homelessness, low income, unemployed, alcoholism, drug addiction, 

transportation, low edu. Level, literacy, decrease access to med. care, skilled nursing, 

rehab)?


@  -No


Was there de-escalation of care discussed even if they declined (Discuss DNR or 

withdrawal of care, Hospice)? DNR status


@  -No


What co-morbidities impacted this encounter? (DM, HTN, Smoking, COPD, CAD, 

Cancer, CVA, ARF, Chemo, Hep., AIDS, mental health diagnosis, sleep apnea, 

morbid obesity)?


@  -None


Was patient admitted / discharged? Hospital course, mention meds given and 

route, prescriptions, significant lab abnormalities, going to OR and other 

pertinent info.


@  -Patient reevaluated and updated.  Patient contracts for safety.  Sister will

 patient.  Sister is familiar with patient's recent mental status and is 

comfortable with discharge. 


Undiagnosed new problem with uncertain prognosis?


@  -No


Drug Therapy requiring intensive monitoring for toxicity (Heparin, Nitro, 

Insulin, Cardizem)?


@  -No


Were any procedures done?


@  -No


Diagnosis/symptom?


@  -depression


Acute, or Chronic, or Acute on Chronic?


@  -Acute on chronic


Uncomplicated (without systemic symptoms) or Complicated (systemic symptoms)?


@  -default


Side effects of treatment?


@  -No


Exacerbation, Progression, or Severe Exacerbation?


@  -No


Poses a threat to life or bodily function? How? (Chest pain, USA, MI, pneumonia,

PE, COPD, DKA, ARF, appy, cholecystitis, CVA, Diverticulitis, Homicidal, 

Suicidal, threat to staff... and all critical care pts)


@  -No





Disposition


Clinical Impression: 


 Depression





Disposition: HOME SELF-CARE


Condition: Stable


Instructions (If sedation given, give patient instructions):  Depression (ED), 

Help Prevent Suicide (ED)


Additional Instructions: 


Discharge to sister.  Please do follow-up with primary care physician in the 

next couple days for recheck.  Also follow-up with mental health services as 

directed.  Return for thoughts of self-harm, worsening symptoms or other 

concerns.


Is patient prescribed a controlled substance at d/c from ED?: No


Referrals: 


Andrez Valdes MD [Primary Care Provider] - 1-2 days


Time of Disposition: 14:35